# Patient Record
Sex: MALE | NOT HISPANIC OR LATINO | Employment: FULL TIME | URBAN - METROPOLITAN AREA
[De-identification: names, ages, dates, MRNs, and addresses within clinical notes are randomized per-mention and may not be internally consistent; named-entity substitution may affect disease eponyms.]

---

## 2023-09-01 PROBLEM — R06.02 SHORT OF BREATH ON EXERTION: Status: ACTIVE | Noted: 2023-09-01

## 2023-09-01 PROBLEM — I48.91 A-FIB (MULTI): Status: ACTIVE | Noted: 2023-09-01

## 2023-09-01 PROBLEM — I48.0 PAROXYSMAL ATRIAL FIBRILLATION (MULTI): Status: ACTIVE | Noted: 2023-09-01

## 2023-09-01 RX ORDER — ATORVASTATIN CALCIUM 80 MG/1
80 TABLET, FILM COATED ORAL
COMMUNITY
Start: 2023-06-07

## 2023-09-01 RX ORDER — VIT C/E/ZN/COPPR/LUTEIN/ZEAXAN 250MG-90MG
CAPSULE ORAL
COMMUNITY
Start: 2022-02-02

## 2023-09-01 RX ORDER — ASPIRIN 81 MG/1
TABLET ORAL
COMMUNITY
Start: 2022-02-02

## 2023-09-01 RX ORDER — TADALAFIL 20 MG/1
TABLET ORAL
COMMUNITY
Start: 2020-09-13

## 2023-09-01 RX ORDER — METOPROLOL TARTRATE 100 MG/1
100 TABLET ORAL 2 TIMES DAILY
COMMUNITY
Start: 2023-06-15 | End: 2024-06-14

## 2023-09-01 RX ORDER — TOBRAMYCIN 3 MG/ML
SOLUTION/ DROPS OPHTHALMIC
COMMUNITY
Start: 2021-05-29

## 2023-09-01 RX ORDER — PRAVASTATIN SODIUM 40 MG/1
1 TABLET ORAL DAILY
COMMUNITY

## 2023-09-01 RX ORDER — BLOOD SUGAR DIAGNOSTIC
STRIP MISCELLANEOUS
COMMUNITY
Start: 2020-12-23

## 2023-09-01 RX ORDER — LISINOPRIL 10 MG/1
1 TABLET ORAL DAILY
COMMUNITY
Start: 2021-04-21

## 2023-09-01 RX ORDER — ALBUTEROL SULFATE 90 UG/1
1-2 AEROSOL, METERED RESPIRATORY (INHALATION)
COMMUNITY
Start: 2022-01-11

## 2023-09-01 RX ORDER — SEMAGLUTIDE 1.34 MG/ML
INJECTION, SOLUTION SUBCUTANEOUS
COMMUNITY

## 2023-09-01 RX ORDER — METFORMIN HYDROCHLORIDE 500 MG/1
2 TABLET ORAL
COMMUNITY
Start: 2021-01-10

## 2023-09-01 RX ORDER — AMLODIPINE BESYLATE 10 MG/1
1 TABLET ORAL DAILY
COMMUNITY
Start: 2021-01-30

## 2023-09-01 RX ORDER — LANCETS 33 GAUGE
EACH MISCELLANEOUS
COMMUNITY

## 2023-09-01 RX ORDER — METOPROLOL TARTRATE 50 MG/1
1 TABLET ORAL 2 TIMES DAILY
COMMUNITY
Start: 2022-01-05

## 2023-10-09 ENCOUNTER — OFFICE VISIT (OUTPATIENT)
Dept: CARDIOLOGY | Facility: CLINIC | Age: 58
End: 2023-10-09
Payer: COMMERCIAL

## 2023-10-09 VITALS
SYSTOLIC BLOOD PRESSURE: 140 MMHG | WEIGHT: 243 LBS | HEART RATE: 82 BPM | DIASTOLIC BLOOD PRESSURE: 80 MMHG | OXYGEN SATURATION: 95 % | BODY MASS INDEX: 28.69 KG/M2 | HEIGHT: 77 IN

## 2023-10-09 DIAGNOSIS — I48.0 PAROXYSMAL ATRIAL FIBRILLATION (MULTI): Primary | ICD-10-CM

## 2023-10-09 PROCEDURE — 99213 OFFICE O/P EST LOW 20 MIN: CPT | Performed by: INTERNAL MEDICINE

## 2023-10-09 NOTE — PROGRESS NOTES
"History Of Present Illness:      This is a 58-year-old male with a history of paroxysmal atrial fibrillation.  He was last seen in the office January 2023.  No complaints of palpitations, chest pain, or shortness of breath.  No recurrences of atrial fibrillation.    This patient had a lengthy hospitalization at [facility] in January 2022 because of COVID-pneumonia.  He developed AF with RVR at that time and required anticoagulation plus high dose of beta-blocker therapy.  He ultimately was discharged home on oxygen and eventually converted back to sinus rhythm.  He has no other systemic complaints.    Review of Systems  Other review of systems negative     Last Recorded Vitals:      1/11/2022    12:55 PM 2/2/2022     1:08 PM 3/7/2022     3:13 PM 7/8/2022     1:08 PM 1/30/2023     1:09 PM 10/9/2023    10:37 AM   Vitals   Systolic 140 106 140 140 140 140   Diastolic 78 68 76 72 80 80   Heart Rate 77 81 86 80 85 82   Temp 36 °C (96.8 °F)        Height (in) 1.956 m (6' 5\") 1.956 m (6' 5\") 1.956 m (6' 5\") 1.956 m (6' 5\") 1.956 m (6' 5\") 1.956 m (6' 5\")   Weight (lb) 225 230 238 241 236 243   BMI 26.68 kg/m2 27.27 kg/m2 28.22 kg/m2 28.58 kg/m2 27.99 kg/m2 28.82 kg/m2   BSA (m2) 2.35 m2 2.38 m2 2.42 m2 2.43 m2 2.41 m2 2.44 m2   Visit Report      Report          Allergies:  Patient has no known allergies.    Outpatient Medications:  Current Outpatient Medications   Medication Instructions    albuterol 90 mcg/actuation inhaler 1-2 puffs, inhalation, Every 4-6 hours as needed     amLODIPine (Norvasc) 10 mg tablet 1 tablet, oral, Daily    aspirin 81 mg EC tablet oral    atorvastatin (LIPITOR) 80 mg, oral, Daily RT    cholecalciferol (Vitamin D-3) 25 MCG (1000 UT) capsule oral    lancets 33 gauge misc miscellaneous, Use once daily as directed to test blood sugar    lisinopril 10 mg tablet 1 tablet, oral, Daily    metFORMIN (Glucophage) 500 mg tablet 2 tablets, oral, 2 times daily with meals    metoprolol tartrate " (Lopressor) 50 mg tablet 1 tablet, oral, 2 times daily    metoprolol tartrate (LOPRESSOR) 100 mg, oral, 2 times daily    miscellaneous medical supply misc APAP 7-20 cmH2O. [facility]. Type .CPAPSettings into a note to see current settings/supplies/DME information.<BR>    OneTouch Ultra Test strip USE AS DIRECTED TO TEST BLOOD SUGAR ONCE DAILY<BR>    pravastatin (Pravachol) 40 mg tablet 1 tablet, oral, Daily    rivaroxaban (Xarelto) 20 mg tablet 1 tablet, oral, Daily    semaglutide (Ozempic) 0.25 mg or 0.5 mg(2 mg/1.5 mL) pen injector subcutaneous    semaglutide 0.5 mg, subcutaneous, Weekly    SITagliptin phosphate (Januvia) 25 mg tablet 1 tablet, oral, Daily    tadalafil 20 mg tablet TAKE ONE TABLET BY MOUTH DAILY AS NEEDED FOR INTERCOURSE 1 TO 2 HOURS BEFORE SEXUAL INTERCOURSE.<BR>    tobramycin (Tobrex) 0.3 % ophthalmic solution ophthalmic (eye)       Physical Exam:    General Appearance:  Alert, oriented, no distress  Skin:  Warm and dry  Head and Neck:  No elevation of JVP, no carotid bruits  Cardiac Exam:  Rhythm is regular, S1 and S2 are normal, no murmur S3 or S4  Lungs:  Clear to auscultation  Extremities:  no edema  Neurologic:  No focal deficits  Psychiatric:  Appropriate mood and behavior        Cardiology Tests:  I have personally review the diagnostic cardiac testing and my interpretation is as follows:    EKG February 2022: Sinus rhythm with no ischemic changes      Assessment/Plan   Problem List Items Addressed This Visit             ICD-10-CM    Paroxysmal atrial fibrillation (CMS/HCC) - Primary I48.0     1.  Paroxysmal atrial fibrillation: This patient has a remote history of atrial fibrillation which occurred in January 2022 in the setting of COVID-pneumonia.  He has had no recurrence and has been off anticoagulation.  Continue beta-blocker therapy.    2.  Hypertension: I have advised Maury to monitor his blood pressure more closely at home and we will increase the lisinopril if necessary.               James C Ramicone, DO

## 2023-10-09 NOTE — ASSESSMENT & PLAN NOTE
1.  Paroxysmal atrial fibrillation: This patient has a remote history of atrial fibrillation which occurred in January 2022 in the setting of COVID-pneumonia.  He has had no recurrence and has been off anticoagulation.  Continue beta-blocker therapy.    2.  Hypertension: I have advised Maury to monitor his blood pressure more closely at home and we will increase the lisinopril if necessary.

## 2024-03-21 ENCOUNTER — TELEPHONE (OUTPATIENT)
Dept: CARDIOLOGY | Facility: CLINIC | Age: 59
End: 2024-03-21
Payer: COMMERCIAL

## 2024-03-21 DIAGNOSIS — I48.0 PAROXYSMAL ATRIAL FIBRILLATION (MULTI): ICD-10-CM

## 2024-03-21 NOTE — TELEPHONE ENCOUNTER
Pt states that while he is at rest, watching tv, for the last couple of days he has been having episodes of SOB and rapid heart rate. It also happens when he lays down at night for bed. It goes away after 10-15 minutes. This is new for him. Please advise.

## 2024-03-21 NOTE — TELEPHONE ENCOUNTER
Called pt. He feels that he is experiencing almost daily episodes of afib. He doesn't monitor HR or BP at home but at work he was able to check HR and it was 80s-90s. Episodes are brief and getting more frequent. Confirmed meds: metop tart 100 bid, asa (xarelto was previously stopped), amlodipine, lipitor and lisinopril.     Messaged Dr Ramicone, he would like pt to wear a 7 day holter monitor. Scheduled pt for holter application on Monday, 3/25. Pt accepts 930 am.

## 2024-03-25 ENCOUNTER — ANCILLARY PROCEDURE (OUTPATIENT)
Dept: CARDIOLOGY | Facility: CLINIC | Age: 59
End: 2024-03-25
Payer: COMMERCIAL

## 2024-03-25 DIAGNOSIS — I48.0 PAROXYSMAL ATRIAL FIBRILLATION (MULTI): ICD-10-CM

## 2024-03-25 PROCEDURE — 93244 EXT ECG>48HR<7D REV&INTERPJ: CPT | Performed by: INTERNAL MEDICINE

## 2024-05-03 ENCOUNTER — PATIENT MESSAGE (OUTPATIENT)
Dept: CARDIOLOGY | Facility: CLINIC | Age: 59
End: 2024-05-03
Payer: COMMERCIAL

## 2024-05-03 DIAGNOSIS — I48.0 PAROXYSMAL ATRIAL FIBRILLATION (MULTI): Primary | ICD-10-CM

## 2024-05-03 NOTE — TELEPHONE ENCOUNTER
From: Maury Mccall  To: James Ramicone  Sent: 5/3/2024 3:38 AM EDT  Subject: Xeralto    Hi I need to refill my xeralto [ blood thinner ] I don't have it listed on my medication list for u took me off it and now u have me back on it. I am low. Just to let u know I may have to go back to baby aspiran until I get it refilled if that's OK

## 2024-05-03 NOTE — PATIENT COMMUNICATION
Will provide pt samples for Xarelto 20mg daily.    Lot: [id number]  Exp: 2/26  4 bottles    Pt to  at [place] office.

## 2024-09-11 PROBLEM — E66.811 OBESITY, CLASS I, BMI 30-34.9: Status: ACTIVE | Noted: 2019-02-16

## 2024-09-11 PROBLEM — Z96.641 HISTORY OF TOTAL HIP REPLACEMENT, RIGHT: Status: ACTIVE | Noted: 2019-09-03

## 2024-09-11 PROBLEM — M51.369 DDD (DEGENERATIVE DISC DISEASE), LUMBAR: Status: ACTIVE | Noted: 2024-09-11

## 2024-09-11 PROBLEM — I10 HYPERTENSION, ESSENTIAL: Status: ACTIVE | Noted: 2019-02-16

## 2024-09-11 PROBLEM — G47.33 OSA (OBSTRUCTIVE SLEEP APNEA): Status: ACTIVE | Noted: 2023-06-05

## 2024-09-11 PROBLEM — M53.86 SCIATICA OF RIGHT SIDE ASSOCIATED WITH DISORDER OF LUMBAR SPINE: Status: ACTIVE | Noted: 2018-10-29

## 2024-09-11 PROBLEM — E11.9 DIABETES (MULTI): Status: ACTIVE | Noted: 2024-09-11

## 2024-09-11 PROBLEM — G47.26 SHIFT WORK SLEEP DISORDER: Status: ACTIVE | Noted: 2023-09-18

## 2024-10-05 PROBLEM — I48.91 A-FIB (MULTI): Status: RESOLVED | Noted: 2023-09-01 | Resolved: 2024-10-05

## 2024-10-06 PROBLEM — I48.0 PAROXYSMAL ATRIAL FIBRILLATION (MULTI): Chronic | Status: ACTIVE | Noted: 2023-09-01

## 2024-10-07 ENCOUNTER — APPOINTMENT (OUTPATIENT)
Dept: CARDIOLOGY | Facility: CLINIC | Age: 59
End: 2024-10-07
Payer: COMMERCIAL

## 2024-10-07 VITALS
BODY MASS INDEX: 28.93 KG/M2 | HEART RATE: 87 BPM | DIASTOLIC BLOOD PRESSURE: 80 MMHG | HEIGHT: 77 IN | OXYGEN SATURATION: 98 % | SYSTOLIC BLOOD PRESSURE: 130 MMHG | WEIGHT: 245 LBS

## 2024-10-07 DIAGNOSIS — I48.0 PAROXYSMAL ATRIAL FIBRILLATION (MULTI): Primary | ICD-10-CM

## 2024-10-07 PROCEDURE — 93000 ELECTROCARDIOGRAM COMPLETE: CPT | Performed by: INTERNAL MEDICINE

## 2024-10-07 PROCEDURE — 3008F BODY MASS INDEX DOCD: CPT | Performed by: INTERNAL MEDICINE

## 2024-10-07 PROCEDURE — 3079F DIAST BP 80-89 MM HG: CPT | Performed by: INTERNAL MEDICINE

## 2024-10-07 PROCEDURE — 99213 OFFICE O/P EST LOW 20 MIN: CPT | Performed by: INTERNAL MEDICINE

## 2024-10-07 PROCEDURE — 4010F ACE/ARB THERAPY RXD/TAKEN: CPT | Performed by: INTERNAL MEDICINE

## 2024-10-07 PROCEDURE — 3075F SYST BP GE 130 - 139MM HG: CPT | Performed by: INTERNAL MEDICINE

## 2024-10-07 NOTE — PROGRESS NOTES
"History Of Present Illness:      This is a 59-year-old male with a history of paroxysmal atrial fibrillation.  He presents for a follow-up evaluation.  No complaints of chest pain or shortness of breath.  He has been experiencing some episodes of palpitations that can last for 5 to 10 minutes.    This patient had a lengthy hospitalization at [facility] in January 2022 because of COVID-pneumonia.  He developed AF with RVR at that time and required anticoagulation plus high dose of beta-blocker therapy.  He ultimately was discharged home on oxygen and eventually converted back to sinus rhythm.  He has no other systemic complaints.    Review of Systems  Other review of systems negative     Last Recorded Vitals:      1/11/2022    12:55 PM 2/2/2022     1:08 PM 3/7/2022     3:13 PM 7/8/2022     1:08 PM 1/30/2023     1:09 PM 10/9/2023    10:37 AM 10/7/2024     9:22 AM   Vitals   Systolic 140 106 140 140 140 140 130   Diastolic 78 68 76 72 80 80 80   Heart Rate 77 81 86 80 85 82 87   Temp 36 °C (96.8 °F)         Height (in) 1.956 m (6' 5\") 1.956 m (6' 5\") 1.956 m (6' 5\") 1.956 m (6' 5\") 1.956 m (6' 5\") 1.956 m (6' 5\") 1.956 m (6' 5\")   Weight (lb) 225 230 238 241 236 243 245   BMI 26.68 kg/m2 27.27 kg/m2 28.22 kg/m2 28.58 kg/m2 27.99 kg/m2 28.82 kg/m2 29.05 kg/m2   BSA (m2) 2.35 m2 2.38 m2 2.42 m2 2.43 m2 2.41 m2 2.44 m2 2.46 m2   Visit Report      Report Report     Allergies:  Patient has no known allergies.    Outpatient Medications:  Current Outpatient Medications   Medication Instructions    amLODIPine (Norvasc) 10 mg tablet 1 tablet, oral, Daily    atorvastatin (LIPITOR) 80 mg, oral, Daily RT    cholecalciferol (Vitamin D-3) 25 MCG (1000 UT) capsule oral    lancets 33 gauge misc miscellaneous, Use once daily as directed to test blood sugar    lisinopril 10 mg tablet 1 tablet, oral, Daily    metFORMIN (Glucophage) 500 mg tablet 2 tablets, oral, 2 times daily (morning and late afternoon)    metoprolol " tartrate (LOPRESSOR) 100 mg, oral, 2 times daily    miscellaneous medical supply [facility] APAP 7-20 cmH2O. [facility]. Type .CPAPSettings into a note to see current settings/supplies/DME information.<BR>    OneTouch Ultra Test strip USE AS DIRECTED TO TEST BLOOD SUGAR ONCE DAILY<BR>    rivaroxaban (XARELTO) 20 mg, oral, Daily    semaglutide (Ozempic) 0.25 mg or 0.5 mg(2 mg/1.5 mL) pen injector subcutaneous    semaglutide 0.5 mg, subcutaneous, Weekly    tadalafil 20 mg tablet TAKE ONE TABLET BY MOUTH DAILY AS NEEDED FOR INTERCOURSE 1 TO 2 HOURS BEFORE SEXUAL INTERCOURSE.<BR>     Physical Exam:    General Appearance:  Alert, oriented, no distress  Skin:  Warm and dry  Head and Neck:  No elevation of JVP, no carotid bruits  Cardiac Exam:  Rhythm is regular, S1 and S2 are normal, no murmur S3 or S4  Lungs:  Clear to auscultation  Extremities:  no edema  Neurologic:  No focal deficits  Psychiatric:  Appropriate mood and behavior      Cardiology Tests:  I have personally review the diagnostic cardiac testing and my interpretation is as follows:    EKG February 2022: Sinus rhythm with no ischemic changes    Assessment/Plan   Problem List Items Addressed This Visit             ICD-10-CM    Paroxysmal atrial fibrillation (Multi) - Primary (Chronic) I48.0     1.  Paroxysmal atrial fibrillation: Maury has a remote history of atrial fibrillation which occurred in January 2022 in the setting of COVID pneumonia.  He is reporting an increase in palpitations but it is not clear if this is atrial fibrillation or another arrhythmia.  If the symptoms persist, a 30-day cardiac event monitor is recommended.  I have ordered the monitor for him and he will call to schedule at some point in the future.  If he is having atrial fibrillation, he would be an excellent candidate for pulmonary vein isolation.  We have no documented recurrences of atrial fibrillation since he had COVID in January 2022.    2.  Hypertension: Continue same medication  regimen.         Relevant Orders    ECG 12 lead (Clinic Performed) (Completed)    Holter Or Event Cardiac Monitor     James C Ramicone, DO

## 2024-10-07 NOTE — ASSESSMENT & PLAN NOTE
1.  Paroxysmal atrial fibrillation: Maury has a remote history of atrial fibrillation which occurred in January 2022 in the setting of COVID pneumonia.  He is reporting an increase in palpitations but it is not clear if this is atrial fibrillation or another arrhythmia.  If the symptoms persist, a 30-day cardiac event monitor is recommended.  I have ordered the monitor for him and he will call to schedule at some point in the future.  If he is having atrial fibrillation, he would be an excellent candidate for pulmonary vein isolation.  We have no documented recurrences of atrial fibrillation since he had COVID in January 2022.    2.  Hypertension: Continue same medication regimen.

## 2024-10-07 NOTE — PATIENT INSTRUCTIONS
Follow up with Dr. Ramicone in 1 year.    Call Dr Ramicone to schedule a 30 day event monitor if the episodes of palpitations become more frequent.

## 2024-10-07 NOTE — LETTER
"October 7, 2024     Leobardo Sewell DO  [address] [facility] [address]    Patient: Maury Mccall   YOB: 1965   Date of Visit: 10/7/2024       Dear Dr. Leobardo Sewell DO:    Thank you for referring Maury Mccall to me for evaluation. Below are my notes for this consultation.  If you have questions, please do not hesitate to call me. I look forward to following your patient along with you.       Sincerely,     James C Ramicone, DO      CC: No Recipients  ______________________________________________________________________________________    History Of Present Illness:      This is a 59-year-old male with a history of paroxysmal atrial fibrillation.  He presents for a follow-up evaluation.  No complaints of chest pain or shortness of breath.  He has been experiencing some episodes of palpitations that can last for 5 to 10 minutes.    This patient had a lengthy hospitalization at [facility] in January 2022 because of COVID-pneumonia.  He developed AF with RVR at that time and required anticoagulation plus high dose of beta-blocker therapy.  He ultimately was discharged home on oxygen and eventually converted back to sinus rhythm.  He has no other systemic complaints.    Review of Systems  Other review of systems negative     Last Recorded Vitals:      1/11/2022    12:55 PM 2/2/2022     1:08 PM 3/7/2022     3:13 PM 7/8/2022     1:08 PM 1/30/2023     1:09 PM 10/9/2023    10:37 AM 10/7/2024     9:22 AM   Vitals   Systolic 140 106 140 140 140 140 130   Diastolic 78 68 76 72 80 80 80   Heart Rate 77 81 86 80 85 82 87   Temp 36 °C (96.8 °F)         Height (in) 1.956 m (6' 5\") 1.956 m (6' 5\") 1.956 m (6' 5\") 1.956 m (6' 5\") 1.956 m (6' 5\") 1.956 m (6' 5\") 1.956 m (6' 5\")   Weight (lb) 225 230 238 241 236 243 245   BMI 26.68 kg/m2 27.27 kg/m2 28.22 kg/m2 28.58 kg/m2 27.99 kg/m2 28.82 kg/m2 29.05 kg/m2   BSA (m2) 2.35 m2 2.38 m2 2.42 m2 2.43 m2 2.41 m2 2.44 m2 2.46 m2   Visit Report      Report Report "     Allergies:  Patient has no known allergies.    Outpatient Medications:  Current Outpatient Medications   Medication Instructions   • amLODIPine (Norvasc) 10 mg tablet 1 tablet, oral, Daily   • atorvastatin (LIPITOR) 80 mg, oral, Daily RT   • cholecalciferol (Vitamin D-3) 25 MCG (1000 UT) capsule oral   • lancets 33 gauge misc miscellaneous, Use once daily as directed to test blood sugar   • lisinopril 10 mg tablet 1 tablet, oral, Daily   • metFORMIN (Glucophage) 500 mg tablet 2 tablets, oral, 2 times daily (morning and late afternoon)   • metoprolol tartrate (LOPRESSOR) 100 mg, oral, 2 times daily   • miscellaneous medical supply [facility] APAP 7-20 cmH2O. [facility]. Type .CPAPSettings into a note to see current settings/supplies/DME information.<BR>   • OneTouch Ultra Test strip USE AS DIRECTED TO TEST BLOOD SUGAR ONCE DAILY<BR>   • rivaroxaban (XARELTO) 20 mg, oral, Daily   • semaglutide (Ozempic) 0.25 mg or 0.5 mg(2 mg/1.5 mL) pen injector subcutaneous   • semaglutide 0.5 mg, subcutaneous, Weekly   • tadalafil 20 mg tablet TAKE ONE TABLET BY MOUTH DAILY AS NEEDED FOR INTERCOURSE 1 TO 2 HOURS BEFORE SEXUAL INTERCOURSE.<BR>     Physical Exam:    General Appearance:  Alert, oriented, no distress  Skin:  Warm and dry  Head and Neck:  No elevation of JVP, no carotid bruits  Cardiac Exam:  Rhythm is regular, S1 and S2 are normal, no murmur S3 or S4  Lungs:  Clear to auscultation  Extremities:  no edema  Neurologic:  No focal deficits  Psychiatric:  Appropriate mood and behavior      Cardiology Tests:  I have personally review the diagnostic cardiac testing and my interpretation is as follows:    EKG February 2022: Sinus rhythm with no ischemic changes    Assessment/Plan  Problem List Items Addressed This Visit             ICD-10-CM    Paroxysmal atrial fibrillation (Multi) - Primary (Chronic) I48.0     1.  Paroxysmal atrial fibrillation: Maury has a remote history of atrial fibrillation which occurred in January 2022 in the  setting of COVID pneumonia.  He is reporting an increase in palpitations but it is not clear if this is atrial fibrillation or another arrhythmia.  If the symptoms persist, a 30-day cardiac event monitor is recommended.  I have ordered the monitor for him and he will call to schedule at some point in the future.  If he is having atrial fibrillation, he would be an excellent candidate for pulmonary vein isolation.  We have no documented recurrences of atrial fibrillation since he had COVID in January 2022.    2.  Hypertension: Continue same medication regimen.         Relevant Orders    ECG 12 lead (Clinic Performed) (Completed)    Holter Or Event Cardiac Monitor     James C Ramicone, DO

## 2024-11-08 ENCOUNTER — ANCILLARY PROCEDURE (OUTPATIENT)
Dept: CARDIOLOGY | Facility: CLINIC | Age: 59
End: 2024-11-08
Payer: COMMERCIAL

## 2024-11-08 ENCOUNTER — APPOINTMENT (OUTPATIENT)
Dept: CARDIOLOGY | Facility: CLINIC | Age: 59
End: 2024-11-08
Payer: COMMERCIAL

## 2024-11-08 ENCOUNTER — APPOINTMENT (OUTPATIENT)
Dept: CARDIOLOGY | Facility: HOSPITAL | Age: 59
End: 2024-11-08
Payer: COMMERCIAL

## 2024-11-08 DIAGNOSIS — I48.0 PAROXYSMAL ATRIAL FIBRILLATION (MULTI): ICD-10-CM

## 2024-11-08 PROCEDURE — 93270 REMOTE 30 DAY ECG REV/REPORT: CPT

## 2024-12-18 PROCEDURE — 93272 ECG/REVIEW INTERPRET ONLY: CPT | Performed by: INTERNAL MEDICINE

## 2025-01-20 ENCOUNTER — PATIENT MESSAGE (OUTPATIENT)
Dept: CARDIOLOGY | Facility: CLINIC | Age: 60
End: 2025-01-20
Payer: COMMERCIAL

## 2025-01-20 DIAGNOSIS — I10 HYPERTENSION, ESSENTIAL: ICD-10-CM

## 2025-01-20 DIAGNOSIS — I48.0 PAROXYSMAL ATRIAL FIBRILLATION (MULTI): Chronic | ICD-10-CM

## 2025-01-24 RX ORDER — METOPROLOL TARTRATE 50 MG/1
50 TABLET ORAL 2 TIMES DAILY
COMMUNITY
Start: 2025-01-23

## 2025-07-14 ENCOUNTER — PATIENT MESSAGE (OUTPATIENT)
Dept: CARDIOLOGY | Facility: CLINIC | Age: 60
End: 2025-07-14
Payer: COMMERCIAL

## 2025-07-14 DIAGNOSIS — I48.0 PAROXYSMAL ATRIAL FIBRILLATION (MULTI): ICD-10-CM

## 2025-07-15 RX ORDER — RIVAROXABAN 20 MG/1
20 TABLET, FILM COATED ORAL DAILY
Qty: 90 TABLET | Refills: 3 | Status: SHIPPED | OUTPATIENT
Start: 2025-07-15

## 2025-10-08 ENCOUNTER — APPOINTMENT (OUTPATIENT)
Dept: CARDIOLOGY | Facility: CLINIC | Age: 60
End: 2025-10-08
Payer: COMMERCIAL